# Patient Record
Sex: FEMALE | Race: WHITE | Employment: UNEMPLOYED | URBAN - METROPOLITAN AREA
[De-identification: names, ages, dates, MRNs, and addresses within clinical notes are randomized per-mention and may not be internally consistent; named-entity substitution may affect disease eponyms.]

---

## 2017-12-04 ENCOUNTER — HOSPITAL ENCOUNTER (OUTPATIENT)
Dept: MRI IMAGING | Age: 52
Discharge: HOME | End: 2017-12-04
Attending: ORTHOPAEDIC SURGERY
Payer: MEDICARE

## 2017-12-04 DIAGNOSIS — M54.2 NECK PAIN: ICD-10-CM

## 2017-12-04 PROCEDURE — 72141 MRI NECK SPINE W/O DYE: CPT

## 2019-05-09 ENCOUNTER — HOSPITAL ENCOUNTER (OUTPATIENT)
Dept: GENERAL RADIOLOGY | Age: 54
Discharge: HOME | End: 2019-05-09
Attending: FAMILY MEDICINE
Payer: MEDICARE

## 2019-05-09 DIAGNOSIS — M25.532 LEFT WRIST PAIN: ICD-10-CM

## 2019-05-09 PROCEDURE — 73110 X-RAY EXAM OF WRIST: CPT

## 2019-07-12 ENCOUNTER — HOSPITAL ENCOUNTER (OUTPATIENT)
Dept: GENERAL RADIOLOGY | Age: 54
Discharge: HOME | End: 2019-07-12
Attending: INTERNAL MEDICINE
Payer: MEDICARE

## 2019-07-12 DIAGNOSIS — M79.605 LEFT LEG PAIN: ICD-10-CM

## 2019-07-12 PROCEDURE — 73502 X-RAY EXAM HIP UNI 2-3 VIEWS: CPT

## 2019-09-03 ENCOUNTER — HOSPITAL ENCOUNTER (OUTPATIENT)
Dept: MAMMOGRAPHY | Age: 54
Discharge: HOME | End: 2019-09-03
Attending: FAMILY MEDICINE
Payer: MEDICARE

## 2019-09-03 DIAGNOSIS — Z12.39 BREAST SCREENING, UNSPECIFIED: ICD-10-CM

## 2019-09-03 DIAGNOSIS — Z78.0 ASYMPTOMATIC POSTMENOPAUSAL STATUS (AGE-RELATED) (NATURAL): ICD-10-CM

## 2019-09-03 PROCEDURE — 77080 DXA BONE DENSITY AXIAL: CPT

## 2019-09-03 PROCEDURE — 77067 SCR MAMMO BI INCL CAD: CPT

## 2022-08-01 ENCOUNTER — TRANSCRIBE ORDER (OUTPATIENT)
Dept: SCHEDULING | Age: 57
End: 2022-08-01

## 2022-08-01 DIAGNOSIS — R22.41 LOCALIZED SWELLING, MASS AND LUMP, LOWER LIMB, RIGHT: Primary | ICD-10-CM

## 2022-08-16 ENCOUNTER — TRANSCRIBE ORDER (OUTPATIENT)
Dept: SCHEDULING | Age: 57
End: 2022-08-16

## 2022-08-16 DIAGNOSIS — R60.0 LOCALIZED EDEMA: Primary | ICD-10-CM

## 2022-08-23 ENCOUNTER — HOSPITAL ENCOUNTER (OUTPATIENT)
Dept: ULTRASOUND IMAGING | Age: 57
Discharge: HOME | End: 2022-08-23
Attending: FAMILY MEDICINE
Payer: MEDICARE

## 2022-08-23 DIAGNOSIS — R60.0 LOCALIZED EDEMA: ICD-10-CM

## 2022-08-23 PROCEDURE — 93971 EXTREMITY STUDY: CPT

## 2023-07-14 ENCOUNTER — CLINICAL DOCUMENTATION (OUTPATIENT)
Facility: HOSPITAL | Age: 58
End: 2023-07-14

## 2023-07-14 NOTE — PROGRESS NOTES
Received referral from referring physician  Neeraj Dubon MD, called patient LVM for patient to return call for scheduling

## 2023-07-20 ENCOUNTER — HOSPITAL ENCOUNTER (OUTPATIENT)
Facility: HOSPITAL | Age: 58
Discharge: HOME | End: 2023-07-20
Payer: MEDICARE

## 2023-07-20 VITALS
WEIGHT: 157 LBS | RESPIRATION RATE: 18 BRPM | BODY MASS INDEX: 26.16 KG/M2 | DIASTOLIC BLOOD PRESSURE: 72 MMHG | HEART RATE: 89 BPM | HEIGHT: 65 IN | TEMPERATURE: 97.9 F | SYSTOLIC BLOOD PRESSURE: 111 MMHG

## 2023-07-20 DIAGNOSIS — L97.412 CHRONIC HEEL ULCER, RIGHT, WITH FAT LAYER EXPOSED (HCC): ICD-10-CM

## 2023-07-20 DIAGNOSIS — Z79.4 TYPE 2 DIABETES MELLITUS WITH DIABETIC NEUROPATHY, WITH LONG-TERM CURRENT USE OF INSULIN (HCC): ICD-10-CM

## 2023-07-20 DIAGNOSIS — E11.40 TYPE 2 DIABETES MELLITUS WITH DIABETIC NEUROPATHY, WITH LONG-TERM CURRENT USE OF INSULIN (HCC): ICD-10-CM

## 2023-07-20 PROCEDURE — 11042 DBRDMT SUBQ TIS 1ST 20SQCM/<: CPT

## 2023-07-20 PROCEDURE — 11045 DBRDMT SUBQ TISS EACH ADDL: CPT

## 2023-07-20 PROCEDURE — 99203 OFFICE O/P NEW LOW 30 MIN: CPT

## 2023-07-20 RX ORDER — SODIUM CHLOR/HYPOCHLOROUS ACID 0.033 %
SOLUTION, IRRIGATION IRRIGATION ONCE
OUTPATIENT
Start: 2023-07-20 | End: 2023-07-20

## 2023-07-20 RX ORDER — LIDOCAINE HYDROCHLORIDE 40 MG/ML
SOLUTION TOPICAL ONCE
OUTPATIENT
Start: 2023-07-20 | End: 2023-07-20

## 2023-07-20 RX ORDER — FERROUS SULFATE 325(65) MG
325 TABLET ORAL
COMMUNITY

## 2023-07-20 RX ORDER — GENTAMICIN SULFATE 1 MG/G
OINTMENT TOPICAL ONCE
OUTPATIENT
Start: 2023-07-20 | End: 2023-07-20

## 2023-07-20 RX ORDER — LIDOCAINE HYDROCHLORIDE 20 MG/ML
JELLY TOPICAL ONCE
OUTPATIENT
Start: 2023-07-20 | End: 2023-07-20

## 2023-07-20 RX ORDER — BETAMETHASONE DIPROPIONATE 0.05 %
OINTMENT (GRAM) TOPICAL ONCE
OUTPATIENT
Start: 2023-07-20 | End: 2023-07-20

## 2023-07-20 RX ORDER — BUPRENORPHINE HYDROCHLORIDE 150 UG/1
FILM, SOLUBLE BUCCAL 2 TIMES DAILY PRN
COMMUNITY

## 2023-07-20 RX ORDER — LIDOCAINE 50 MG/G
OINTMENT TOPICAL ONCE
OUTPATIENT
Start: 2023-07-20 | End: 2023-07-20

## 2023-07-20 RX ORDER — IBUPROFEN 200 MG
TABLET ORAL ONCE
OUTPATIENT
Start: 2023-07-20 | End: 2023-07-20

## 2023-07-20 RX ORDER — ORAL SEMAGLUTIDE 3 MG/1
TABLET ORAL DAILY
COMMUNITY

## 2023-07-20 RX ORDER — INSULIN DEGLUDEC 100 U/ML
50 INJECTION, SOLUTION SUBCUTANEOUS 2 TIMES DAILY
COMMUNITY

## 2023-07-20 RX ORDER — CLOBETASOL PROPIONATE 0.5 MG/G
OINTMENT TOPICAL ONCE
OUTPATIENT
Start: 2023-07-20 | End: 2023-07-20

## 2023-07-20 RX ORDER — LIDOCAINE 40 MG/G
CREAM TOPICAL ONCE
OUTPATIENT
Start: 2023-07-20 | End: 2023-07-20

## 2023-07-20 RX ORDER — BACILLUS COAGULANS/INULIN 1B-250 MG
CAPSULE ORAL
COMMUNITY

## 2023-07-20 RX ORDER — GINSENG 100 MG
CAPSULE ORAL ONCE
OUTPATIENT
Start: 2023-07-20 | End: 2023-07-20

## 2023-07-20 RX ORDER — PERPHENAZINE 4 MG
TABLET ORAL DAILY
COMMUNITY

## 2023-07-20 RX ORDER — DIPHENHYDRAMINE HCL 25 MG
25 CAPSULE ORAL EVERY 6 HOURS PRN
COMMUNITY

## 2023-07-20 RX ORDER — BACITRACIN ZINC AND POLYMYXIN B SULFATE 500; 1000 [USP'U]/G; [USP'U]/G
OINTMENT TOPICAL ONCE
OUTPATIENT
Start: 2023-07-20 | End: 2023-07-20

## 2023-07-20 NOTE — DISCHARGE INSTRUCTIONS
Discharge Instructions for  [address] Theresa Phillip  Telephone: [phone]    [address] [place] [address] Information: Should you experience any significant changes in your wound(s) or have questions about your wound care, please contact the [address] [place] [address] at 1 [facility] 8:00 am - 4:30. If you need help with your wound outside these hours and cannot wait until we are again available, contact your PCP or go to the hospital emergency room. NAME:  Genaro Cardenas  YOB: 1965  DATE:  7/20/2023    : Umberto Chaidez     [x] Wound and dressing supply provider: [facility]    Wound Cleansing:   Do not scrub or use excessive force. Cleanse wound prior to applying a clean dressing with:  [] Normal Saline   [] Keep Wound Dry in Shower - may purchase a cast cover at local pharmacy     [x] Cleanse wound with Mild Soap & Water    [x] May Shower at Discharge: remove dressing 1st, redress wound right after with a new dressing  [] Do not shower  [] cleanse with baby shampoo lather leave 2-3 then rinse with water    Topical Treatments:  Do not apply lotions, creams, or ointments to wound bed unless directed. [] Apply moisturizing lotion *** to skin surrounding the wound prior to dressing change.   [] Other:     Assistive Devices and Offloading:     [x] Wear shoes that are open in the back and do not apply pressure to the wound area    [x] Try to keep heel off of resting on surfaces, float heel in air with a pillow under the calf     Dressings:                   Wound Location Left Heel     Apply Primary Dressing:      [x] Santyl Ointment - nickel thick layer    Cover and Secure with:  [x] Gauze [] ABD [] exudry     [] Devika [x] Kerlix [] Mepilex Border  [] Ace Wrap [x] Roll Tape   [x] Other: netting     Change dressing:   [x] Daily        Dietary:  [] Diet as tolerated [x] Diabetic Diet   [x] Increase Protein:

## 2023-07-20 NOTE — FLOWSHEET NOTE
07/20/23 1002   Wound 07/20/23 Heel Left #1   Date First Assessed/Time First Assessed: 07/20/23 0924   Present on Hospital Admission: Yes  Wound Approximate Age at First Assessment (Weeks): 5 weeks  Location: Heel  Wound Location Orientation: Left  Wound Description (Comments): #1   Dressing/Treatment Gauze dressing/dressing sponge;Roll gauze;Tape/Soft cloth adhesive tape; Other (comment)  (santyl, netting)   Offloading for Diabetic Foot Ulcers Offloading ordered     Discharge Condition: Stable    Pain: 0    Ambulatory Status: None    Discharge Destination: home    Transportation:car    Accompanied by: SELF    Discharge instructions reviewed with patient and copy or written instructions have been provided. All questions/concerns have been addressed at this time.

## 2023-07-20 NOTE — PLAN OF CARE
[address] [place] [address] West:     [address] [place] [address] Drive:     [address] [place] [id number]  WOUND CARE Dept: [phone]   FAX NUMBER: [phone]    Patient Information:      Johanna Delacruz  [address] [facility] [address]   [phone]   : 1965  AGE: 56 y.o. GENDER: female   EPISODE DATE: 2023    Insurance:      PRIMARY INSURANCE:  Plan: Tretha Holter COMPLETE  Coverage: [facility] MEDICARE  Effective Date: 2023  Group Number: [unfilled]  Subscriber Number: [id number] - (Medicare Managed)    Payer/Plan Subscr  Sex Relation Sub. Ins. ID Effective Group Num   1. Theresa GORDILLO 1965 Female Self [id number]                                   [address]       Patient Wound Information:      Problem List Items Addressed This Visit          Endocrine    Diabetes mellitus with diabetic neuropathy (HCC)    Relevant Medications    Semaglutide (RYBELSUS) 3 MG TABS    empagliflozin (JARDIANCE) 25 MG tablet    Insulin Degludec (TRESIBA) 100 UNIT/ML SOLN       Other    Chronic heel ulcer, right, with fat layer exposed ([address])       WOUNDS REQUIRING DRESSING SUPPLIES:     Wound 23 Heel Left #1 (Active)   Wound Image   23   Wound Etiology Diabetic 23   Wound Cleansed Cleansed with saline 23   Dressing/Treatment Gauze dressing/dressing sponge;Roll gauze;Tape/Soft cloth adhesive tape; Other (comment) 23 1002   Offloading for Diabetic Foot Ulcers Offloading ordered 23 1002   Wound Length (cm) 5.5 cm 2324   Wound Width (cm) 4.2 cm 23   Wound Depth (cm) 0.1 cm 23   Wound Surface Area (cm^2) 23.1 cm^2 23   Wound Volume (cm^3) 2.31 cm^3 2324   Post-Procedure Length (cm) 5.7 cm 2339   Post-Procedure Width (cm) 4.4 cm 23   Post-Procedure Depth (cm) 0.3 cm 23   Post-Procedure

## 2023-07-20 NOTE — PROGRESS NOTES
cm        -------  Wound 23 Heel Left #1 (Active)   Wound Image   23   Wound Etiology Diabetic 23   Wound Cleansed Cleansed with saline 23   Dressing/Treatment Gauze dressing/dressing sponge;Roll gauze;Tape/Soft cloth adhesive tape; Other (comment) 23 1002   Offloading for Diabetic Foot Ulcers Offloading ordered 23 1002   Wound Length (cm) 5.5 cm 23   Wound Width (cm) 4.2 cm 23   Wound Depth (cm) 0.1 cm 23   Wound Surface Area (cm^2) 23.1 cm^2 23   Wound Volume (cm^3) 2.31 cm^3 23   Post-Procedure Length (cm) 5.7 cm 23   Post-Procedure Width (cm) 4.4 cm 23   Post-Procedure Depth (cm) 0.3 cm 23   Post-Procedure Surface Area (cm^2) 25.08 cm^2 23   Post-Procedure Volume (cm^3) 7.524 cm^3 23   Wound Assessment Pink/red; Other (Comment) 23   Drainage Amount None 23   Odor None 23   Janae-wound Assessment Hyperkeratosis (callous) 23   Margins Flat/open edges 23   Number of days: 0          -------    Past Medical History:   Diagnosis Date    Anxiety     Arthritis     DDD    Asthma     Breast cancer screening, high risk patient 2014    Cellulitis and abscess 2013    Chronic pain     back    Depression     Diabetes (HCC)     insulin dependent    Gastroparesis     GERD (gastroesophageal reflux disease)     High cholesterol     Hypertension     not taking med right now    Insulin dependent type 2 diabetes mellitus, uncontrolled     Macular degeneration     surgery    Nausea & vomiting     Peripheral neuropathy     Unspecified adverse effect of anesthesia     requires extra sedation due to chronic narcotic use    Vitamin D deficiency      Past Surgical History:   Procedure Laterality Date    APPENDECTOMY      ARTHROSCOPY, SHOULDER, SURGI      CARPAL TUNNEL RELEASE  2014    R wrist    GYN       x 2

## 2023-07-21 NOTE — WOUND CARE
[facility] unable to provide supplies for patient due to insurance. [facility] to transfer order to [address] with Liza Bojorquez from [facility] and she stated she would try to get the patient some complementary supplies to last the weekend.

## 2023-08-03 ENCOUNTER — HOSPITAL ENCOUNTER (OUTPATIENT)
Facility: HOSPITAL | Age: 58
Discharge: HOME | End: 2023-08-03
Payer: MEDICARE

## 2023-08-03 VITALS
DIASTOLIC BLOOD PRESSURE: 78 MMHG | RESPIRATION RATE: 16 BRPM | SYSTOLIC BLOOD PRESSURE: 119 MMHG | TEMPERATURE: 97.3 F | HEART RATE: 90 BPM

## 2023-08-03 DIAGNOSIS — L97.412 CHRONIC HEEL ULCER, RIGHT, WITH FAT LAYER EXPOSED (HCC): Primary | ICD-10-CM

## 2023-08-03 PROCEDURE — 11043 DBRDMT MUSC&/FSCA 1ST 20/<: CPT

## 2023-08-03 RX ORDER — CLOBETASOL PROPIONATE 0.5 MG/G
OINTMENT TOPICAL ONCE
OUTPATIENT
Start: 2023-08-03 | End: 2023-08-03

## 2023-08-03 RX ORDER — IBUPROFEN 200 MG
TABLET ORAL ONCE
OUTPATIENT
Start: 2023-08-03 | End: 2023-08-03

## 2023-08-03 RX ORDER — LIDOCAINE HYDROCHLORIDE 20 MG/ML
JELLY TOPICAL ONCE
OUTPATIENT
Start: 2023-08-03 | End: 2023-08-03

## 2023-08-03 RX ORDER — LIDOCAINE 50 MG/G
OINTMENT TOPICAL ONCE
OUTPATIENT
Start: 2023-08-03 | End: 2023-08-03

## 2023-08-03 RX ORDER — BETAMETHASONE DIPROPIONATE 0.05 %
OINTMENT (GRAM) TOPICAL ONCE
OUTPATIENT
Start: 2023-08-03 | End: 2023-08-03

## 2023-08-03 RX ORDER — LIDOCAINE HYDROCHLORIDE 40 MG/ML
SOLUTION TOPICAL ONCE
OUTPATIENT
Start: 2023-08-03 | End: 2023-08-03

## 2023-08-03 RX ORDER — GINSENG 100 MG
CAPSULE ORAL ONCE
OUTPATIENT
Start: 2023-08-03 | End: 2023-08-03

## 2023-08-03 RX ORDER — LIDOCAINE 40 MG/G
CREAM TOPICAL ONCE
OUTPATIENT
Start: 2023-08-03 | End: 2023-08-03

## 2023-08-03 RX ORDER — GENTAMICIN SULFATE 1 MG/G
OINTMENT TOPICAL ONCE
OUTPATIENT
Start: 2023-08-03 | End: 2023-08-03

## 2023-08-03 RX ORDER — BACITRACIN ZINC AND POLYMYXIN B SULFATE 500; 1000 [USP'U]/G; [USP'U]/G
OINTMENT TOPICAL ONCE
OUTPATIENT
Start: 2023-08-03 | End: 2023-08-03

## 2023-08-03 RX ORDER — SODIUM CHLOR/HYPOCHLOROUS ACID 0.033 %
SOLUTION, IRRIGATION IRRIGATION ONCE
OUTPATIENT
Start: 2023-08-03 | End: 2023-08-03

## 2023-08-03 NOTE — FLOWSHEET NOTE
08/03/23 1018   Wound 07/20/23 Heel Left #1   Date First Assessed/Time First Assessed: 07/20/23 0924   Present on Hospital Admission: Yes  Wound Approximate Age at First Assessment (Weeks): 5 weeks  Location: Heel  Wound Location Orientation: Left  Wound Description (Comments): #1   Wound Cleansed Soap and water   Dressing/Treatment Gauze dressing/dressing sponge;Roll gauze  (santyl)     Discharge Condition: Stable    Pain: 0    Ambulatory Status: Independent  Discharge Destination: home    Transportation:car    Accompanied by: SELF    Discharge instructions reviewed with patient and copy or written instructions have been provided. All questions/concerns have been addressed at this time.

## 2023-08-03 NOTE — PROGRESS NOTES
Surface Area (cm^2) 17.15 cm^2 23 0915   Change in Wound Size % (l*w) 25.76 23 0915   Wound Volume (cm^3) 5.145 cm^3 23 0915   Wound Healing % -123 2315   Post-Procedure Length (cm) 4.9 cm 23   Post-Procedure Width (cm) 3.5 cm 23   Post-Procedure Depth (cm) 0.6 cm 23   Post-Procedure Surface Area (cm^2) 17.15 cm^2 23   Post-Procedure Volume (cm^3) 10.29 cm^3 23   Wound Assessment [place]/red;Slough 23   Drainage Amount Moderate 23   Drainage Description Serosanguinous 2315   Odor None 23   Janae-wound Assessment Blanchable erythema; Hyperkeratosis (callous); Maceration 23   Margins Flat/open edges; Defined edges 2315   Wound Thickness Description not for Pressure Injury Full thickness 23 0939   Number of days: 14          -------    Past Medical History:   Diagnosis Date    Anxiety     Arthritis     DDD    Asthma     Breast cancer screening, high risk patient 2014    Cellulitis and abscess 2013    Chronic pain     back    Depression     Diabetes ([address])     insulin dependent    Gastroparesis     GERD (gastroesophageal reflux disease)     High cholesterol     Hypertension     not taking med right now    Insulin dependent type 2 diabetes mellitus, uncontrolled     Macular degeneration     surgery    Nausea & vomiting     Peripheral neuropathy     Unspecified adverse effect of anesthesia     requires extra sedation due to chronic narcotic use    Vitamin D deficiency      Past Surgical History:   Procedure Laterality Date    APPENDECTOMY      ARTHROSCOPY, SHOULDER, SURGI      CARPAL TUNNEL RELEASE  2014    R wrist    GYN       x 2    HEENT      Right eye, mac degeneration surg    HEENT      wisdom teeth    HYSTERECTOMY (CERVIX STATUS UNKNOWN)  9/16/10    BSO    LUMBAR LAMINECTOMY      back surgery x6    ORTHOPEDIC SURGERY  10/2012    cervical fusion.     ORTHOPEDIC

## 2023-08-17 ENCOUNTER — HOSPITAL ENCOUNTER (OUTPATIENT)
Facility: HOSPITAL | Age: 58
Discharge: HOME | End: 2023-08-17
Payer: MEDICARE

## 2023-08-17 VITALS
SYSTOLIC BLOOD PRESSURE: 125 MMHG | HEART RATE: 82 BPM | TEMPERATURE: 97.2 F | DIASTOLIC BLOOD PRESSURE: 73 MMHG | RESPIRATION RATE: 16 BRPM

## 2023-08-17 DIAGNOSIS — L97.412 CHRONIC HEEL ULCER, RIGHT, WITH FAT LAYER EXPOSED (HCC): Primary | ICD-10-CM

## 2023-08-17 PROCEDURE — 11043 DBRDMT MUSC&/FSCA 1ST 20/<: CPT

## 2023-08-17 RX ORDER — BETAMETHASONE DIPROPIONATE 0.05 %
OINTMENT (GRAM) TOPICAL ONCE
OUTPATIENT
Start: 2023-08-17 | End: 2023-08-17

## 2023-08-17 RX ORDER — BACITRACIN ZINC AND POLYMYXIN B SULFATE 500; 1000 [USP'U]/G; [USP'U]/G
OINTMENT TOPICAL ONCE
OUTPATIENT
Start: 2023-08-17 | End: 2023-08-17

## 2023-08-17 RX ORDER — IBUPROFEN 200 MG
TABLET ORAL ONCE
OUTPATIENT
Start: 2023-08-17 | End: 2023-08-17

## 2023-08-17 RX ORDER — SODIUM CHLOR/HYPOCHLOROUS ACID 0.033 %
SOLUTION, IRRIGATION IRRIGATION ONCE
OUTPATIENT
Start: 2023-08-17 | End: 2023-08-17

## 2023-08-17 RX ORDER — GENTAMICIN SULFATE 1 MG/G
OINTMENT TOPICAL ONCE
OUTPATIENT
Start: 2023-08-17 | End: 2023-08-17

## 2023-08-17 RX ORDER — CLOBETASOL PROPIONATE 0.5 MG/G
OINTMENT TOPICAL ONCE
OUTPATIENT
Start: 2023-08-17 | End: 2023-08-17

## 2023-08-17 RX ORDER — LIDOCAINE 40 MG/G
CREAM TOPICAL ONCE
OUTPATIENT
Start: 2023-08-17 | End: 2023-08-17

## 2023-08-17 RX ORDER — GINSENG 100 MG
CAPSULE ORAL ONCE
OUTPATIENT
Start: 2023-08-17 | End: 2023-08-17

## 2023-08-17 RX ORDER — LIDOCAINE HYDROCHLORIDE 20 MG/ML
JELLY TOPICAL ONCE
OUTPATIENT
Start: 2023-08-17 | End: 2023-08-17

## 2023-08-17 RX ORDER — LIDOCAINE HYDROCHLORIDE 40 MG/ML
SOLUTION TOPICAL ONCE
OUTPATIENT
Start: 2023-08-17 | End: 2023-08-17

## 2023-08-17 RX ORDER — LIDOCAINE 50 MG/G
OINTMENT TOPICAL ONCE
OUTPATIENT
Start: 2023-08-17 | End: 2023-08-17

## 2023-08-17 NOTE — FLOWSHEET NOTE
08/17/23 1044   Wound 07/20/23 Heel Left #1   Date First Assessed/Time First Assessed: 07/20/23 0924   Present on Hospital Admission: Yes  Wound Approximate Age at First Assessment (Weeks): 5 weeks  Location: Heel  Wound Location Orientation: Left  Wound Description (Comments): #1   Dressing/Treatment Collagen;Alginate;Gauze dressing/dressing sponge;Roll gauze;Tape/Soft cloth adhesive tape  (Netting)   Offloading for Diabetic Foot Ulcers Offloading ordered   Pain Assessment   Pain Assessment None - Denies Pain     Discharge Condition: Stable    Pain: 0    Ambulatory Status:Walking    Discharge Destination: Home    Transportation:Car    Accompanied by: Self    Discharge instructions reviewed with Self and copy or written instructions have been provided. All questions/concerns have been addressed at this time.

## 2023-08-17 NOTE — DISCHARGE INSTRUCTIONS
Discharge Instructions for  [address] Theresa Phillip  Telephone: [phone]     [address] [place] [address] Information: Should you experience any significant changes in your wound(s) or have questions about your wound care, please contact the [address] [place] [address] at 1 [facility] 8:00 am - 4:30. If you need help with your wound outside these hours and cannot wait until we are again available, contact your PCP or go to the hospital emergency room. NAME:  Tori Ham  YOB: 1965  DATE:  8/17/2023     : Michael Avitia      [x] Wound and dressing supply provider: [facility]      Wound Cleansing:   Do not scrub or use excessive force. Cleanse wound prior to applying a clean dressing with:  [] Normal Saline          [x] Keep Wound Dry in Shower - may purchase a cast cover at local pharmacy     [x] Cleanse wound with Mild Soap & Water    [x] May Shower at Discharge  [] Do not shower  [] cleanse with baby shampoo lather leave 2-3 then rinse with water     Topical Treatments:  Do not apply lotions, creams, or ointments to wound bed unless directed. [] Apply moisturizing lotion *** to skin surrounding the wound prior to dressing change.   [] Other:      Assistive Devices and Offloading:                [x] Wear shoes that are open in the back and do not apply pressure to the wound area    [x] Try to keep heel off of resting on surfaces, float heel in air with a pillow under the calf      Dressings:              Wound Location Left Heel      Apply Primary Dressing:                                [x] Endoform (collagen) 1st  [x] Alginate 2nd     Cover and Secure with:  [x] Gauze        [] ABD            [] exudry     [] Devika           [x] Kerlix          [] Mepilex Border  [] Ace Wrap   [x] Roll Tape   [x] Other: netting                Change dressing:        [x] Every other day               Dietary:  [] Diet as

## 2023-08-17 NOTE — PLAN OF CARE
[address] [place] [address] West:     [address], [address] [facility] f: [phone] f: [phone] p: [phone] [email or web address]      Ordering Center:     [address] [place] [address] Drive  [phone]  WOUND CARE Dept: [phone]   [facility] NUMBER [phone]    Patient Information:      Audrey Parkinson  [address] [facility] [address] [phone]   : 1965  AGE: 56 y.o. GENDER: female   EPISODE DATE: 2023    Insurance:      PRIMARY INSURANCE:  Plan: José Varela COMPLETE  Coverage: [facility] MEDICARE  Effective Date: 2023  Group Number: [unfilled]  Subscriber Number: [id number] - (Medicare Managed)    Payer/Plan Subscr  Sex Relation Sub. Ins. ID Effective Group Num   1.  Allanberg G 1965 Female Self [id number]                                   [address]       Patient Wound Information:      Problem List Items Addressed This Visit          Other    Chronic heel ulcer, right, with fat layer exposed ([address]) - Primary    Relevant Orders    Initiate Outpatient Wound Care Protocol       WOUNDS REQUIRING DRESSING SUPPLIES:     Wound 23 Heel Left #1 (Active)   Wound Image   23 1004   Wound Etiology Diabetic 23 0939   Dressing Status Old drainage noted 23 0915   Wound Cleansed Soap and water 23 1018   Dressing/Treatment Gauze dressing/dressing sponge;Roll gauze 23 1018   Offloading for Diabetic Foot Ulcers Offloading ordered 23 1018   Wound Length (cm) 3.5 cm 23 1004   Wound Width (cm) 3.1 cm 23 1004   Wound Depth (cm) 0.3 cm 23 1004   Wound Surface Area (cm^2) 10.85 cm^2 23 1004   Change in Wound Size % (l*w) 53.03 23 1004   Wound Volume (cm^3) 3.255 cm^3 23 1004   Wound Healing % -41 23 1004   Post-Procedure Length (cm) 3.7 cm 23 1034   Post-Procedure Width (cm) 3.3 cm

## 2023-08-31 ENCOUNTER — HOSPITAL ENCOUNTER (OUTPATIENT)
Facility: HOSPITAL | Age: 58
Discharge: HOME | End: 2023-08-31
Payer: MEDICARE

## 2023-08-31 VITALS
TEMPERATURE: 97.3 F | RESPIRATION RATE: 16 BRPM | DIASTOLIC BLOOD PRESSURE: 72 MMHG | SYSTOLIC BLOOD PRESSURE: 109 MMHG | HEART RATE: 91 BPM

## 2023-08-31 DIAGNOSIS — L97.412 CHRONIC HEEL ULCER, RIGHT, WITH FAT LAYER EXPOSED (HCC): Primary | ICD-10-CM

## 2023-08-31 PROCEDURE — 11043 DBRDMT MUSC&/FSCA 1ST 20/<: CPT

## 2023-08-31 RX ORDER — LIDOCAINE HYDROCHLORIDE 20 MG/ML
JELLY TOPICAL ONCE
OUTPATIENT
Start: 2023-08-31 | End: 2023-08-31

## 2023-08-31 RX ORDER — GENTAMICIN SULFATE 1 MG/G
OINTMENT TOPICAL ONCE
OUTPATIENT
Start: 2023-08-31 | End: 2023-08-31

## 2023-08-31 RX ORDER — SODIUM CHLOR/HYPOCHLOROUS ACID 0.033 %
SOLUTION, IRRIGATION IRRIGATION ONCE
OUTPATIENT
Start: 2023-08-31 | End: 2023-08-31

## 2023-08-31 RX ORDER — BETAMETHASONE DIPROPIONATE 0.05 %
OINTMENT (GRAM) TOPICAL ONCE
OUTPATIENT
Start: 2023-08-31 | End: 2023-08-31

## 2023-08-31 RX ORDER — LIDOCAINE 40 MG/G
CREAM TOPICAL ONCE
OUTPATIENT
Start: 2023-08-31 | End: 2023-08-31

## 2023-08-31 RX ORDER — GINSENG 100 MG
CAPSULE ORAL ONCE
OUTPATIENT
Start: 2023-08-31 | End: 2023-08-31

## 2023-08-31 RX ORDER — LIDOCAINE HYDROCHLORIDE 40 MG/ML
SOLUTION TOPICAL ONCE
OUTPATIENT
Start: 2023-08-31 | End: 2023-08-31

## 2023-08-31 RX ORDER — IBUPROFEN 200 MG
TABLET ORAL ONCE
OUTPATIENT
Start: 2023-08-31 | End: 2023-08-31

## 2023-08-31 RX ORDER — LIDOCAINE 50 MG/G
OINTMENT TOPICAL ONCE
OUTPATIENT
Start: 2023-08-31 | End: 2023-08-31

## 2023-08-31 RX ORDER — CLOBETASOL PROPIONATE 0.5 MG/G
OINTMENT TOPICAL ONCE
OUTPATIENT
Start: 2023-08-31 | End: 2023-08-31

## 2023-08-31 RX ORDER — BACITRACIN ZINC AND POLYMYXIN B SULFATE 500; 1000 [USP'U]/G; [USP'U]/G
OINTMENT TOPICAL ONCE
OUTPATIENT
Start: 2023-08-31 | End: 2023-08-31

## 2023-08-31 NOTE — PLAN OF CARE
[address] [place] [address] West:     [address] [place] [address] [place], [address] [facility] f: [phone] f: [phone] p: [phone] [email or web address]      Ordering Center:     [id number] [place] [address] [address]  [phone]  WOUND CARE Dept: [phone]   [facility] NUMBER [phone]    Patient Information:      Nicolasa Tierney  [address] [facility] [address] [phone]   : 1965  AGE: 56 y.o. GENDER: female   EPISODE DATE: 2023    Insurance:      PRIMARY INSURANCE:  Plan: light COMPLETE  Coverage: [facility] MEDICARE  Effective Date: 2023  Group Number: [unfilled]  Subscriber Number: [id number] - (Medicare Managed)    Payer/Plan Subscr  Sex Relation Sub. Ins. ID Effective Group Num   1. Theresa G 1965 Female Self [id number]                                   [address]       Patient Wound Information:      Problem List Items Addressed This Visit          Other    Chronic heel ulcer, right, with fat layer exposed [facility]) - Primary    Relevant Orders    Initiate Outpatient Wound Care Protocol       WOUNDS REQUIRING DRESSING SUPPLIES:     Wound 23 Heel Left #1 (Active)   Wound Image   23 1430   Wound Etiology Diabetic 23 0939   Dressing Status Old drainage noted 23 1430   Wound Cleansed Cleansed with saline 23 1430   Dressing/Treatment Collagen;Alginate;Gauze dressing/dressing sponge;Roll gauze;Tape/Soft cloth adhesive tape; Other (comment) 23 1507   Offloading for Diabetic Foot Ulcers Offloading ordered 23 1507   Wound Length (cm) 3.2 cm 23 1430   Wound Width (cm) 3 cm 23 1430   Wound Depth (cm) 0.2 cm 23 1430   Wound Surface Area (cm^2) 9.6 cm^2 23 1430   Change in Wound Size % (l*w) 58.44 23 1430   Wound Volume (cm^3) 1.92 cm^3 23 1430   Wound Healing % 17 23 1430   Post-Procedure Length

## 2023-08-31 NOTE — PROGRESS NOTES
SURGERY  2/2012    fusion in neck    ORTHOPEDIC SURGERY      fx nose    ORTHOPEDIC SURGERY      back surgery x6    CT APPENDECTOMY      SHOULDER SURG PROC UNLISTED      r and l rotator cuff     Family History   Problem Relation Age of Onset    Breast Cancer Father     Cancer Sister         breast    Breast Cancer Mother 48    Cancer Mother         breast    Hypertension Mother     Other Neg Hx     Post-op Cognitive Dysfunction Neg Hx     Emergence Delirium Neg Hx     Post-op Nausea/Vomiting Neg Hx     Delayed Awakening Neg Hx     Pseudochol. Deficiency Neg Hx     Malig Hypertherm Neg Hx     Breast Cancer Maternal Uncle     Cancer Sister         cervical    Breast Cancer Sister 46    Breast Cancer Paternal Grandmother     Breast Cancer Maternal Aunt      Social History     Socioeconomic History    Marital status: Legally      Spouse name: None    Number of children: None    Years of education: None    Highest education level: None   Tobacco Use    Smoking status: Former     Packs/day: 0.25     Types: Cigarettes     Quit date: 6/16/2013     Years since quitting: 10.2    Smokeless tobacco: Never   Substance and Sexual Activity    Alcohol use: No    Drug use: No        Prior to Admission medications    Medication Sig Start Date End Date Taking?  Authorizing Provider   vitamin E 100 units capsule Take 1 capsule by mouth daily    Historical Provider, MD   ferrous sulfate (IRON 325) 325 (65 Fe) MG tablet Take 1 tablet by mouth daily (with breakfast)    Historical Provider, MD   diphenhydrAMINE (BENADRYL) 25 MG capsule Take 1 capsule by mouth every 6 hours as needed for Itching    Historical Provider, MD   Semaglutide (RYBELSUS) 3 MG TABS Take by mouth daily    Historical Provider, MD   empagliflozin (JARDIANCE) 25 MG tablet Take 1 tablet by mouth daily    Historical Provider, MD   Collagen-Vitamin C-Biotin (COLLAGEN 1500/C) 500-50-0.8 MG CAPS Take by mouth daily    Historical Provider, MD   Buprenorphine HCl

## 2023-08-31 NOTE — DISCHARGE INSTRUCTIONS
Discharge Instructions for  [address] Theresa Phillip  Telephone: [phone]     [address] [place] [address] Information: Should you experience any significant changes in your wound(s) or have questions about your wound care, please contact the [address] [place] [address] at 1 [facility] 8:00 am - 4:30. If you need help with your wound outside these hours and cannot wait until we are again available, contact your PCP or go to the hospital emergency room. NAME:  Waldo Stanford  YOB: 1965  DATE:  8/31/2023     : Warren Shea      [x] Wound and dressing supply provider: [facility]      Wound Cleansing:   Do not scrub or use excessive force. Cleanse wound prior to applying a clean dressing with:  [] Normal Saline          [x] Keep Wound Dry in Shower - may purchase a cast cover at local pharmacy     [x] Cleanse wound with Mild Soap & Water    [x] May Shower at Discharge  [] Do not shower  [] cleanse with baby shampoo lather leave 2-3 then rinse with water     Topical Treatments:  Do not apply lotions, creams, or ointments to wound bed unless directed. [] Apply moisturizing lotion *** to skin surrounding the wound prior to dressing change.   [] Other:      Assistive Devices and Offloading:                [x] Wear shoes that are open in the back and do not apply pressure to the wound area    [x] Try to keep heel off of resting on surfaces, float heel in air with a pillow under the calf      Dressings:              Wound Location Left Heel      Apply Primary Dressing:                                [x] Endoform (collagen) 1st  [x] Alginate 2nd     Cover and Secure with:  [x] Gauze        [] ABD            [] exudry     [] Devika           [x] Kerlix          [] Mepilex Border  [] Ace Wrap   [x] Roll Tape   [x] Other: netting                Change dressing:        [x] Daily or every other day              Dietary:  []

## 2023-08-31 NOTE — FLOWSHEET NOTE
08/31/23 1507   Wound 07/20/23 Heel Left #1   Date First Assessed/Time First Assessed: 07/20/23 0924   Present on Hospital Admission: Yes  Wound Approximate Age at First Assessment (Weeks): 5 weeks  Location: Heel  Wound Location Orientation: Left  Wound Description (Comments): #1   Dressing/Treatment Collagen;Alginate;Gauze dressing/dressing sponge;Roll gauze;Tape/Soft cloth adhesive tape; Other (comment)  (netting)   Offloading for Diabetic Foot Ulcers Offloading ordered     Discharge Condition: Stable    Pain: 0    Ambulatory Status: None    Discharge Destination: home    Transportation:car    Accompanied by: SELF    Discharge instructions reviewed with patient and copy or written instructions have been provided. All questions/concerns have been addressed at this time.

## 2023-09-14 ENCOUNTER — HOSPITAL ENCOUNTER (OUTPATIENT)
Facility: HOSPITAL | Age: 58
Discharge: HOME | End: 2023-09-14
Payer: MEDICARE

## 2023-09-14 VITALS
RESPIRATION RATE: 14 BRPM | SYSTOLIC BLOOD PRESSURE: 114 MMHG | TEMPERATURE: 97.3 F | DIASTOLIC BLOOD PRESSURE: 68 MMHG | HEART RATE: 89 BPM

## 2023-09-14 DIAGNOSIS — L97.412 CHRONIC HEEL ULCER, RIGHT, WITH FAT LAYER EXPOSED (HCC): Primary | ICD-10-CM

## 2023-09-14 PROCEDURE — 11042 DBRDMT SUBQ TIS 1ST 20SQCM/<: CPT

## 2023-09-14 RX ORDER — GINSENG 100 MG
CAPSULE ORAL ONCE
OUTPATIENT
Start: 2023-09-14 | End: 2023-09-14

## 2023-09-14 RX ORDER — LIDOCAINE HYDROCHLORIDE 20 MG/ML
JELLY TOPICAL ONCE
OUTPATIENT
Start: 2023-09-14 | End: 2023-09-14

## 2023-09-14 RX ORDER — LIDOCAINE HYDROCHLORIDE 40 MG/ML
SOLUTION TOPICAL ONCE
OUTPATIENT
Start: 2023-09-14 | End: 2023-09-14

## 2023-09-14 RX ORDER — CLOBETASOL PROPIONATE 0.5 MG/G
OINTMENT TOPICAL ONCE
OUTPATIENT
Start: 2023-09-14 | End: 2023-09-14

## 2023-09-14 RX ORDER — BACITRACIN ZINC AND POLYMYXIN B SULFATE 500; 1000 [USP'U]/G; [USP'U]/G
OINTMENT TOPICAL ONCE
OUTPATIENT
Start: 2023-09-14 | End: 2023-09-14

## 2023-09-14 RX ORDER — LIDOCAINE 50 MG/G
OINTMENT TOPICAL ONCE
OUTPATIENT
Start: 2023-09-14 | End: 2023-09-14

## 2023-09-14 RX ORDER — SODIUM CHLOR/HYPOCHLOROUS ACID 0.033 %
SOLUTION, IRRIGATION IRRIGATION ONCE
OUTPATIENT
Start: 2023-09-14 | End: 2023-09-14

## 2023-09-14 RX ORDER — BETAMETHASONE DIPROPIONATE 0.05 %
OINTMENT (GRAM) TOPICAL ONCE
OUTPATIENT
Start: 2023-09-14 | End: 2023-09-14

## 2023-09-14 RX ORDER — IBUPROFEN 200 MG
TABLET ORAL ONCE
OUTPATIENT
Start: 2023-09-14 | End: 2023-09-14

## 2023-09-14 RX ORDER — GENTAMICIN SULFATE 1 MG/G
OINTMENT TOPICAL ONCE
OUTPATIENT
Start: 2023-09-14 | End: 2023-09-14

## 2023-09-14 RX ORDER — LIDOCAINE 40 MG/G
CREAM TOPICAL ONCE
OUTPATIENT
Start: 2023-09-14 | End: 2023-09-14

## 2023-09-14 NOTE — DISCHARGE INSTRUCTIONS
Diabetic Diet            [x] Increase Protein: examples (Meat, cheese, eggs, greek yogurt, fish, nuts)          [] [facility] [employer] Nutrition Powder  [] Dial a Dietician : Call [employer] at [phone] enter code ([phone]) when prompted. M-F 9am-5pm EST. Return Appointment:  [] Nurse Visit at wound center in *** days   [x] Return Appointment: With Dr. Corrie Mancuso in 2 week(s)  [] Ordered tests:      Electronically signed on 9/14/2023 at 8:01 AM      PLEASE NOTE: IF YOU ARE UNABLE  [facility], CONTINUE TO USE THE SUPPLIES YOU HAVE AVAILABLE UNTIL YOU ARE ABLE  [facility]. IT IS MOST IMPORTANT TO KEEP THE WOUND COVERED AT ALL TIMES.      Physician Signature:_______________________  Dr. Price Gallo

## 2023-09-14 NOTE — FLOWSHEET NOTE
09/14/23 1010   Wound 07/20/23 Heel Left #1   Date First Assessed/Time First Assessed: 07/20/23 0924   Present on Hospital Admission: Yes  Wound Approximate Age at First Assessment (Weeks): 5 weeks  Location: Heel  Wound Location Orientation: Left  Wound Description (Comments): #1   Dressing Status New dressing applied   Dressing/Treatment Alginate with Ag;Gauze dressing/dressing sponge;Roll gauze;Tape/Soft cloth adhesive tape   Offloading for Diabetic Foot Ulcers Offloading ordered     Discharge Condition: Stable    Pain: 0    Ambulatory Status: None    Discharge Destination: home    Transportation:car    Accompanied by: SELF    Discharge instructions reviewed with SELF and copy or written instructions have been provided. All questions/concerns have been addressed at this time.

## 2023-11-16 ENCOUNTER — HOSPITAL ENCOUNTER (OUTPATIENT)
Facility: HOSPITAL | Age: 58
Discharge: HOME | End: 2023-11-16
Payer: MEDICARE

## 2023-11-16 VITALS
DIASTOLIC BLOOD PRESSURE: 73 MMHG | SYSTOLIC BLOOD PRESSURE: 122 MMHG | HEART RATE: 93 BPM | TEMPERATURE: 98.1 F | RESPIRATION RATE: 18 BRPM

## 2023-11-16 DIAGNOSIS — L97.412 CHRONIC HEEL ULCER, RIGHT, WITH FAT LAYER EXPOSED (HCC): Primary | ICD-10-CM

## 2023-11-16 PROCEDURE — 99212 OFFICE O/P EST SF 10 MIN: CPT

## 2023-11-16 RX ORDER — SODIUM CHLOR/HYPOCHLOROUS ACID 0.033 %
SOLUTION, IRRIGATION IRRIGATION ONCE
Status: CANCELLED | OUTPATIENT
Start: 2023-11-16 | End: 2023-11-16

## 2023-11-16 RX ORDER — LIDOCAINE HYDROCHLORIDE 40 MG/ML
SOLUTION TOPICAL ONCE
Status: CANCELLED | OUTPATIENT
Start: 2023-11-16 | End: 2023-11-16

## 2023-11-16 RX ORDER — LIDOCAINE 40 MG/G
CREAM TOPICAL ONCE
Status: CANCELLED | OUTPATIENT
Start: 2023-11-16 | End: 2023-11-16

## 2023-11-16 RX ORDER — LIDOCAINE HYDROCHLORIDE 20 MG/ML
JELLY TOPICAL ONCE
Status: CANCELLED | OUTPATIENT
Start: 2023-11-16 | End: 2023-11-16

## 2023-11-16 RX ORDER — IBUPROFEN 200 MG
TABLET ORAL ONCE
Status: CANCELLED | OUTPATIENT
Start: 2023-11-16 | End: 2023-11-16

## 2023-11-16 RX ORDER — BETAMETHASONE DIPROPIONATE 0.05 %
OINTMENT (GRAM) TOPICAL ONCE
Status: CANCELLED | OUTPATIENT
Start: 2023-11-16 | End: 2023-11-16

## 2023-11-16 RX ORDER — GINSENG 100 MG
CAPSULE ORAL ONCE
Status: CANCELLED | OUTPATIENT
Start: 2023-11-16 | End: 2023-11-16

## 2023-11-16 RX ORDER — TRIAMCINOLONE ACETONIDE 1 MG/G
OINTMENT TOPICAL ONCE
Status: CANCELLED | OUTPATIENT
Start: 2023-11-16 | End: 2023-11-16

## 2023-11-16 RX ORDER — CLOBETASOL PROPIONATE 0.5 MG/G
OINTMENT TOPICAL ONCE
Status: CANCELLED | OUTPATIENT
Start: 2023-11-16 | End: 2023-11-16

## 2023-11-16 RX ORDER — GENTAMICIN SULFATE 1 MG/G
OINTMENT TOPICAL ONCE
Status: CANCELLED | OUTPATIENT
Start: 2023-11-16 | End: 2023-11-16

## 2023-11-16 RX ORDER — LIDOCAINE 50 MG/G
OINTMENT TOPICAL ONCE
Status: CANCELLED | OUTPATIENT
Start: 2023-11-16 | End: 2023-11-16

## 2023-11-16 RX ORDER — BACITRACIN ZINC AND POLYMYXIN B SULFATE 500; 1000 [USP'U]/G; [USP'U]/G
OINTMENT TOPICAL ONCE
Status: CANCELLED | OUTPATIENT
Start: 2023-11-16 | End: 2023-11-16

## 2023-11-16 NOTE — FLOWSHEET NOTE
11/16/23 1016   [REMOVED] Wound 07/20/23 Heel Left #1   Final Assessment Date/Final Assessment Time: 11/16/23 1006  Date First Assessed/Time First Assessed: 07/20/23 0924   Present on Original Admission: Yes  Wound Approximate Age at First Assessment (Weeks): 5 weeks  Location: Heel  Wound Location [place]. .. Dressing/Treatment Honey gel/honey paste;Gauze dressing/dressing sponge;Roll gauze;Tape/Soft cloth adhesive tape   Offloading for Diabetic Foot Ulcers Offloading ordered     Discharge Condition: Stable    Pain: 0    Ambulatory Status:Walking    Discharge Destination: Home    Transportation:Car    Accompanied by: Self    Discharge instructions reviewed with Self and copy or written instructions have been provided. All questions/concerns have been addressed at this time. AVS reviewed.

## 2023-11-16 NOTE — PATIENT INSTRUCTIONS
Discharge Instructions for  [address] Theresa Phillip  Telephone: [phone]     [address] [place] [address] Information: Should you experience any significant changes in your wound(s) or have questions about your wound care, please contact the [address] [place] [address] at 1 [facility] 8:00 am - 4:30. If you need help with your wound outside these hours and cannot wait until we are again available, contact your PCP or go to the hospital emergency room. NAME:  Kate Argueta  YOB: 1965  DATE:  11/16/2023     : Unique Will      [x] May Shower at Discharge    [x] Apply moisturizing lotion such as A&D ointment to area daily once it is completely healed     Assistive Devices and Offloading:                [x] Wear shoes that are open in the back and do not apply pressure to the wound area    [x] Try to keep heel off of resting on surfaces, float heel in air with a pillow under the calf      Dressings:              Wound Location Left Heel      Apply Primary Dressing:                                [x] Medi-honey gel     Cover and Secure with:  [x] Gauze        [] ABD            [] exudry     [] Devika           [x] Kerlix          [] Mepilex Border  [] Ace Wrap   [x] Roll Tape   [x] Other: netting                Change dressing:        [x] every other day              [address] treatment has been completed at [facility] outpatient wound clinic on 11/16/2023, per Dr. Price Gallo. We know patients have several options when choosing a health care provider. We would like to express our sincere appreciation for having had the chance to be yours. More importantly, we enjoy having you as part of our family. We also hope your experience with us has surpassed your expectations and that you have been pleased with our service.  We appreciate the confidence you've shown in selecting us as your health care

## 2023-11-16 NOTE — PROGRESS NOTES
Other (See Comments)     Hallucinations, pt states does not make throat swell    Gabapentin Hives          Signed By: Dominick Tatum MD      11/16/23

## 2024-06-07 ENCOUNTER — HOSPITAL ENCOUNTER (OUTPATIENT)
Facility: HOSPITAL | Age: 59
End: 2024-06-07
Payer: MEDICARE

## 2024-06-07 DIAGNOSIS — R60.0 LOCALIZED EDEMA: ICD-10-CM

## 2024-06-07 PROCEDURE — 93970 EXTREMITY STUDY: CPT

## 2024-11-12 ENCOUNTER — HOSPITAL ENCOUNTER (OUTPATIENT)
Facility: HOSPITAL | Age: 59
Discharge: HOME | End: 2024-11-15
Payer: MEDICAID

## 2024-11-12 DIAGNOSIS — M81.0 AGE-RELATED OSTEOPOROSIS WITHOUT CURRENT PATHOLOGICAL FRACTURE: ICD-10-CM

## 2024-11-12 DIAGNOSIS — Z12.31 ENCOUNTER FOR SCREENING MAMMOGRAM FOR MALIGNANT NEOPLASM OF BREAST: ICD-10-CM

## 2024-11-12 PROCEDURE — 77063 BREAST TOMOSYNTHESIS BI: CPT

## 2024-11-12 PROCEDURE — 77080 DXA BONE DENSITY AXIAL: CPT

## 2024-11-25 NOTE — DISCHARGE INSTRUCTIONS
Discharge Instructions for  [address] Theresa Phillip  Telephone: [phone]     [address] [place] [address] Information: Should you experience any significant changes in your wound(s) or have questions about your wound care, please contact the [address] [place] [address] at 1 [facility] 8:00 am - 4:30. If you need help with your wound outside these hours and cannot wait until we are again available, contact your PCP or go to the hospital emergency room. NAME:  Julio Queen  YOB: 1965  DATE:  8/3/2023     : Prema Reynoso      [x] Wound and dressing supply provider:     Wound Cleansing:   Do not scrub or use excessive force. Cleanse wound prior to applying a clean dressing with:  [] Normal Saline          [] Keep Wound Dry in Shower - may purchase a cast cover at local pharmacy     [x] Cleanse wound with Mild Soap & Water    [x] May Shower at Discharge: remove dressing 1st, redress wound right after with a new dressing  [] Do not shower  [] cleanse with baby shampoo lather leave 2-3 then rinse with water     Topical Treatments:  Do not apply lotions, creams, or ointments to wound bed unless directed. [] Apply moisturizing lotion *** to skin surrounding the wound prior to dressing change.   [] Other:      Assistive Devices and Offloading:                [x] Wear shoes that are open in the back and do not apply pressure to the wound area    [x] Try to keep heel off of resting on surfaces, float heel in air with a pillow under the calf      Dressings:                   Wound Location Left Heel      Apply Primary Dressing:                                [x] Santyl Ointment - nickel thick layer     Cover and Secure with:  [x] Gauze        [] ABD            [] exudry     [] Devika           [x] Kerlix          [] Mepilex Border  [] Ace Wrap   [x] Roll Tape   [x] Other: netting                Change dressing:
Her/She

## 2025-07-11 ENCOUNTER — TRANSCRIBE ORDERS (OUTPATIENT)
Facility: HOSPITAL | Age: 60
End: 2025-07-11

## 2025-07-11 DIAGNOSIS — M14.672 CHARCOT'S JOINT OF ANKLE, LEFT: Primary | ICD-10-CM

## 2025-07-25 ENCOUNTER — HOSPITAL ENCOUNTER (OUTPATIENT)
Facility: HOSPITAL | Age: 60
Discharge: HOME | End: 2025-07-28
Payer: MEDICARE

## 2025-07-25 DIAGNOSIS — M14.672 CHARCOT'S JOINT OF ANKLE, LEFT: ICD-10-CM

## 2025-07-25 PROCEDURE — 73700 CT LOWER EXTREMITY W/O DYE: CPT

## 2025-07-25 PROCEDURE — 73718 MRI LOWER EXTREMITY W/O DYE: CPT
